# Patient Record
Sex: MALE | Race: WHITE | NOT HISPANIC OR LATINO | URBAN - METROPOLITAN AREA
[De-identification: names, ages, dates, MRNs, and addresses within clinical notes are randomized per-mention and may not be internally consistent; named-entity substitution may affect disease eponyms.]

---

## 2019-04-29 ENCOUNTER — EMERGENCY (EMERGENCY)
Facility: HOSPITAL | Age: 64
LOS: 0 days | Discharge: HOME | End: 2019-04-30
Attending: EMERGENCY MEDICINE | Admitting: EMERGENCY MEDICINE
Payer: COMMERCIAL

## 2019-04-29 VITALS — HEIGHT: 72 IN | WEIGHT: 210.1 LBS
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
## 2019-04-29 DIAGNOSIS — S61.213A LACERATION WITHOUT FOREIGN BODY OF LEFT MIDDLE FINGER WITHOUT DAMAGE TO NAIL, INITIAL ENCOUNTER: ICD-10-CM
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
## 2019-04-29 DIAGNOSIS — Z23 ENCOUNTER FOR IMMUNIZATION: ICD-10-CM

## 2019-04-29 PROCEDURE — 99284 EMERGENCY DEPT VISIT MOD MDM: CPT | Mod: 25

## 2019-04-29 RX ORDER — TETANUS TOXOID, REDUCED DIPHTHERIA TOXOID AND ACELLULAR PERTUSSIS VACCINE, ADSORBED 5; 2.5; 8; 8; 2.5 [IU]/.5ML; [IU]/.5ML; UG/.5ML; UG/.5ML; UG/.5ML
0.5 SUSPENSION INTRAMUSCULAR ONCE
Qty: 0 | Refills: 0 | Status: COMPLETED | OUTPATIENT
Start: 2019-04-29 | End: 2019-04-29

## 2019-04-29 RX ORDER — CEPHALEXIN 500 MG
500 CAPSULE ORAL ONCE
Qty: 0 | Refills: 0 | Status: COMPLETED | OUTPATIENT
Start: 2019-04-29 | End: 2019-04-29

## 2019-04-29 NOTE — ED PROVIDER NOTE - PROGRESS NOTE DETAILS
spoke with orthopedic resident viktor for partial amputation will give pt keflex and tdap prophylaxis. KAYLEEN Nazario DO pt seen and repaired by orthopedic resident ready for d/c to home KAYLEEN Nazario DO

## 2019-04-29 NOTE — ED PROVIDER NOTE - SKIN, MLM
Skin normal color for race, warm, dry and intact. No evidence of rash. left third finger with partial amputation to distal aspect with no blood flow to flap, no cap refill in partial amputation flap, left 4th finger with 1 cm avulsion laceration with missing skin to doral aspect of dip, from, sensation intact, left 5th finger with v shaped laceration between dip and pip no active bleeding.. from and sensory intact

## 2019-04-29 NOTE — ED PROVIDER NOTE - CARE PROVIDER_API CALL
So Amin)  Orthopaedics  [address] [place] [address]  Phone: [phone]  Fax: [phone]  Follow Up Time:

## 2019-04-29 NOTE — ED PROVIDER NOTE - OBJECTIVE STATEMENT
65 yo male pw with laceration to 3 fingers from a glass dish tonight. occurred approx 1 hour ago. pt is rhd. left third finger with incomplate amputation, left 4th finger with avulsion injury to dip and 5th finger with flap laceration no active bleeding.

## 2019-04-29 NOTE — ED PROVIDER NOTE - CLINICAL SUMMARY MEDICAL DECISION MAKING FREE TEXT BOX
pt with finger laceration and finger partial amputation will give keflex and adacel withortho hand consult for partial amputation

## 2019-04-29 NOTE — ED ADULT NURSE NOTE - NSIMPLEMENTINTERV_GEN_ALL_ED
suprapubic region Implemented All Universal Safety Interventions:  Jacob to call system. Call bell, personal items and telephone within reach. Instruct patient to call for assistance. Room bathroom lighting operational. Non-slip footwear when patient is off stretcher. Physically safe environment: no spills, clutter or unnecessary equipment. Stretcher in lowest position, wheels locked, appropriate side rails in place.

## 2019-04-30 VITALS
OXYGEN SATURATION: 100 % | SYSTOLIC BLOOD PRESSURE: 148 MMHG | RESPIRATION RATE: 18 BRPM | DIASTOLIC BLOOD PRESSURE: 97 MMHG | HEART RATE: 72 BPM

## 2019-04-30 PROCEDURE — 73130 X-RAY EXAM OF HAND: CPT | Mod: 26,LT

## 2019-04-30 RX ORDER — CEPHALEXIN 500 MG
1 CAPSULE ORAL
Qty: 28 | Refills: 0 | OUTPATIENT
Start: 2019-04-30 | End: 2019-05-06

## 2019-04-30 RX ADMIN — TETANUS TOXOID, REDUCED DIPHTHERIA TOXOID AND ACELLULAR PERTUSSIS VACCINE, ADSORBED 0.5 MILLILITER(S): 5; 2.5; 8; 8; 2.5 SUSPENSION INTRAMUSCULAR at 00:05

## 2019-04-30 RX ADMIN — Medication 500 MILLIGRAM(S): at 00:05

## 2019-04-30 NOTE — CONSULT NOTE ADULT - SUBJECTIVE AND OBJECTIVE BOX
64y Male community ambulatory RHD presents c/o L 3rd/4th digit lacerations that occurred while dish broke while washing . Denies and other trauma or injury. Denies numbness/tingling. No other pain/injuries. Denies fevers/chills. The patient no significant past ortho Hx. Received IV abx and tdap in ED    PAST MEDICAL & SURGICAL HISTORY:  No pertinent past medical history  No significant past surgical history      MEDICATIONS  (STANDING):  Denies    Allergies  No Known Allergies      Vital Signs Last 24 Hrs  T(C): 36.7 (04-29-19 @ 23:02), Max: 36.7 (04-29-19 @ 23:02)  T(F): 98 (04-29-19 @ 23:02), Max: 98 (04-29-19 @ 23:02)  HR: 72 (04-30-19 @ 02:19) (72 - 92)  BP: 148/97 (04-30-19 @ 02:19) (148/97 - 150/103)  BP(mean): --  RR: 18 (04-30-19 @ 02:19) (18 - 18)  SpO2: 100% (04-30-19 @ 02:19) (98% - 100%)      Imaging:   XR neg Fx L hand/digits    Physical Exam  Gen: NAD  LUE: Warm/well perfused distal aspect digits 1,2,4,5. simicircular lac on dig 3 volar tip w/ minimal bleeding at time of exam, no nail bed involvement, superficial and distal to flexor tendo. sensation intact distally full painless ROM all digits    Procedure: L hand was prepped and draped in sterile fashion, 10cc of 1% lidocaine was used to perform a metacarpal digit block of the L 3rd4th . The laceration was cleaned and approximated using 5-0 gut sutures. The tourniquet was let down with subsequent brisk capillary refill at the tip of the digit. The laceration was dressed with sterile 4x4 and tyshawn The patient tolerated the procedure well. NVI post-procedure.

## 2019-04-30 NOTE — CONSULT NOTE ADULT - ASSESSMENT
A/P: 64y Male with L distal finger tip laceration 3rd digit  Pain control  Ice/elevate as needed  Keep dressing clean and dry  Antibiotics per ER physician   All question answered  Follow up with Dr. maldonado as outpatient within 1 week, call office for appointment   Ortho stable for discharge

## 2020-01-01 NOTE — ED ADULT NURSE NOTE - NSFALLRSKASSESSTYPE_ED_ALL_ED
Mucous membranes moist and pink without lesions; alveolar ridge smooth and edentulous; lip, palate and uvula with acceptable anatomic shape; normal tongue, frenulum and cheek exam; mandible size acceptable.
Initial (On Arrival)

## 2020-06-26 ENCOUNTER — TRANSCRIPTION ENCOUNTER (OUTPATIENT)
Age: 65
End: 2020-06-26

## 2021-03-27 ENCOUNTER — TRANSCRIPTION ENCOUNTER (OUTPATIENT)
Age: 66
End: 2021-03-27

## 2021-04-23 ENCOUNTER — TRANSCRIPTION ENCOUNTER (OUTPATIENT)
Age: 66
End: 2021-04-23

## 2024-08-12 ENCOUNTER — APPOINTMENT (OUTPATIENT)
Dept: ORTHOPEDIC SURGERY | Facility: CLINIC | Age: 69
End: 2024-08-12
Payer: MEDICARE

## 2024-08-12 VITALS — HEIGHT: 72 IN | WEIGHT: 220 LBS | BODY MASS INDEX: 29.8 KG/M2

## 2024-08-12 DIAGNOSIS — M54.16 RADICULOPATHY, LUMBAR REGION: ICD-10-CM

## 2024-08-12 DIAGNOSIS — Z78.9 OTHER SPECIFIED HEALTH STATUS: ICD-10-CM

## 2024-08-12 DIAGNOSIS — Z98.1 OTHER INTERVERTEBRAL DISC DEGENERATION, LUMBAR REGION: ICD-10-CM

## 2024-08-12 DIAGNOSIS — Z98.1 ARTHRODESIS STATUS: ICD-10-CM

## 2024-08-12 DIAGNOSIS — M51.36 OTHER INTERVERTEBRAL DISC DEGENERATION, LUMBAR REGION: ICD-10-CM

## 2024-08-12 DIAGNOSIS — Z00.00 ENCOUNTER FOR GENERAL ADULT MEDICAL EXAMINATION W/OUT ABNORMAL FINDINGS: ICD-10-CM

## 2024-08-12 PROCEDURE — 99203 OFFICE O/P NEW LOW 30 MIN: CPT

## 2024-08-12 PROCEDURE — 72100 X-RAY EXAM L-S SPINE 2/3 VWS: CPT

## 2024-08-12 RX ORDER — METHYLPREDNISOLONE 4 MG/1
4 TABLET ORAL
Qty: 1 | Refills: 0 | Status: ACTIVE | COMMUNITY
Start: 2024-08-12 | End: 1900-01-01

## 2024-08-12 RX ORDER — CYCLOBENZAPRINE HYDROCHLORIDE 10 MG/1
10 TABLET, FILM COATED ORAL 3 TIMES DAILY
Qty: 60 | Refills: 0 | Status: ACTIVE | COMMUNITY
Start: 2024-08-12 | End: 1900-01-01

## 2024-08-12 NOTE — DISCUSSION/SUMMARY
[de-identified] : 69 Y M pt is s/p lumbar spine surgery, recurrence of back pains and radiculopathy, I am requesting updated MRI images of lumbar spine to eval for adjacent segment degeneration. I am prescribing the pt Cyclobenzaprine to aid in symptom control. MDP RX'd to aid in symptom control.  Patient was educated and informed on their condition along with the expected outcomes.   F/U in 2 weeks.   Prior to appointment and during encounter with patient extensive medical records were reviewed including but not limited to, hospital records, out patient records, imaging results, and lab data. During this appointment the patient was examined, diagnoses were discussed and explained in a face to face manner. In addition extensive time was spent reviewing aforementioned diagnostic studies. Counseling including abnormal image results, differential diagnoses, treatment options, risk and benefits, lifestyle changes, current condition, and current medications was performed. Patient's comments, questions, and concerns were address and patient verbalized understanding. Based on this patient's presentation at our office, which is an orthopedic spine surgeon's office, this patient inherently / intrinsically has a risk, however minute, of developing issues such as Cauda equina syndrome, bowel and bladder changes, or progression of motor or neurological deficits such as paralysis which may be permanent.   I, Florecita Sanchez, attest that this documentation has been prepared under the direction and in the presence of provider Owen Higgins MD.

## 2024-08-12 NOTE — DISCUSSION/SUMMARY
[de-identified] : 69 Y M pt is s/p lumbar spine surgery, recurrence of back pains and radiculopathy, I am requesting updated MRI images of lumbar spine to eval for adjacent segment degeneration. I am prescribing the pt Cyclobenzaprine to aid in symptom control. MDP RX'd to aid in symptom control.  Patient was educated and informed on their condition along with the expected outcomes.   F/U in 2 weeks.   Prior to appointment and during encounter with patient extensive medical records were reviewed including but not limited to, hospital records, out patient records, imaging results, and lab data. During this appointment the patient was examined, diagnoses were discussed and explained in a face to face manner. In addition extensive time was spent reviewing aforementioned diagnostic studies. Counseling including abnormal image results, differential diagnoses, treatment options, risk and benefits, lifestyle changes, current condition, and current medications was performed. Patient's comments, questions, and concerns were address and patient verbalized understanding. Based on this patient's presentation at our office, which is an orthopedic spine surgeon's office, this patient inherently / intrinsically has a risk, however minute, of developing issues such as Cauda equina syndrome, bowel and bladder changes, or progression of motor or neurological deficits such as paralysis which may be permanent.   I, Florecita Sanchez, attest that this documentation has been prepared under the direction and in the presence of provider Owen Higgins MD.

## 2024-08-12 NOTE — DATA REVIEWED
[FreeTextEntry1] : On my interpretations of these images from [facility] in [place] on 08/12/2024: I have independently reviewed and interpreted these images. 2 V lumbar x-ray- L4-5 PSF/TLIF w/ small diameter pedicle screws present, appears to be [employer] Solara. adv ddd L3-4 w large osteophytes anteriorly and facet degeneration BL, L4-5 appears to be well healed.

## 2024-08-12 NOTE — PHYSICAL EXAM
[NL (90)] : forward flexion 90 degrees [Flexion] : flexion [3___] : left dorsiflexors 3[unfilled]/5 [0___] : left extensor hallicus longus 0[unfilled]/5 [5___] : right extensor hallicus longus 5[unfilled]/5 [de-identified] : Constitutional: - General Appearance: Unremarkable Body Habitus Well Developed Well Nourished Body Habitus No Deformities Well Groomed Ability To communicate: Normal Neurologic: Global sensation is intact to upper and lower extremities. See examination of Neck and/or Spine for exceptions. Orientation to Time, Place and Person is: Normal Mood And Affect is Normal Skin: - Head/Face, Right Upper/Lower Extremity, Left Upper/Lower Extremity: Normal See Examination of Neck and/or Spine for exceptions Cardiovascular: Peripheral Cardiovascular System is Normal Palpation of Lymph Nodes: Normal Palpation of lymph nodes in: Axilla, Cervical, Inguinal Abnormal Palpation of lymph nodes in: None  [] : non-antalgic [FreeTextEntry9] : FF does cause back & LT sciatic pain [de-identified] : L4 and L5 paresthesia's LT.  [de-identified] : extension 5 degrees

## 2024-08-12 NOTE — DATA REVIEWED
[FreeTextEntry1] : On my interpretations of these images from [facility] in [place] on 08/12/2024: I have independently reviewed and interpreted these images. 2 V lumbar x-ray- L4-5 PSF/TLIF w/ small diameter pedicle screws present, appears to be [employer] Solara. adv ddd L3-4 w large osteophytes anteriorly and facet degeneration BL, L4-5 appears to be well healed.

## 2024-08-12 NOTE — PHYSICAL EXAM
[NL (90)] : forward flexion 90 degrees [Flexion] : flexion [3___] : left dorsiflexors 3[unfilled]/5 [0___] : left extensor hallicus longus 0[unfilled]/5 [5___] : right extensor hallicus longus 5[unfilled]/5 [de-identified] : Constitutional: - General Appearance: Unremarkable Body Habitus Well Developed Well Nourished Body Habitus No Deformities Well Groomed Ability To communicate: Normal Neurologic: Global sensation is intact to upper and lower extremities. See examination of Neck and/or Spine for exceptions. Orientation to Time, Place and Person is: Normal Mood And Affect is Normal Skin: - Head/Face, Right Upper/Lower Extremity, Left Upper/Lower Extremity: Normal See Examination of Neck and/or Spine for exceptions Cardiovascular: Peripheral Cardiovascular System is Normal Palpation of Lymph Nodes: Normal Palpation of lymph nodes in: Axilla, Cervical, Inguinal Abnormal Palpation of lymph nodes in: None  [] : non-antalgic [FreeTextEntry9] : FF does cause back & LT sciatic pain [de-identified] : L4 and L5 paresthesia's LT.  [de-identified] : extension 5 degrees

## 2024-08-12 NOTE — HISTORY OF PRESENT ILLNESS
[de-identified] : 08/12/2024:  69 Y M presenting today for a re-evaluation. He did well post-operatively and back pains were controlled. Pt was traveling abroad in the beginning of July 2024 and states after prolonged traveling of near 20 hours and walking on uneven surfaces has re-exacerbation of back pains. C/o LT sided back pain w/ radicular symptoms to posterior & anterior LLE. 2 weeks prior LLE paresthesia's began.  Pt's LT drop foot remains present; it is improved but never resolved post-op. Kidneys & heart controlled. Low grade cancer, resolved, will follow up with repeat colonoscopy in 2 years. Pt is retired.   PSHx: discectomy (2011) & L4-5 PSF/TLIF (2012)   The patient is a 69 year old male who presents today complaining of low back/left sided sciatic pain Date of Injury/Onset:  3 weeks Pain:    At Rest: _/10 With Activity:  _/10 Mechanism of injury:  possibly from a 20hr flight Quality of symptoms:  sharp shooting pain from low back into left glute and down left leg Improves with:  meds, PT Worse with: at night Prior treatment:   over 1 yr ago Prior Imaging:  no Additional Information: None

## 2024-08-15 ENCOUNTER — RESULT REVIEW (OUTPATIENT)
Age: 69
End: 2024-08-15

## 2024-09-09 ENCOUNTER — APPOINTMENT (OUTPATIENT)
Dept: ORTHOPEDIC SURGERY | Facility: CLINIC | Age: 69
End: 2024-09-09
Payer: MEDICARE

## 2024-09-09 VITALS — BODY MASS INDEX: 29.8 KG/M2 | HEIGHT: 72 IN | WEIGHT: 220 LBS

## 2024-09-09 DIAGNOSIS — M51.26 OTHER INTERVERTEBRAL DISC DISPLACEMENT, LUMBAR REGION: ICD-10-CM

## 2024-09-09 DIAGNOSIS — Z98.1 ARTHRODESIS STATUS: ICD-10-CM

## 2024-09-09 DIAGNOSIS — M54.16 RADICULOPATHY, LUMBAR REGION: ICD-10-CM

## 2024-09-09 DIAGNOSIS — M51.36 OTHER INTERVERTEBRAL DISC DEGENERATION, LUMBAR REGION: ICD-10-CM

## 2024-09-09 DIAGNOSIS — Z98.1 OTHER INTERVERTEBRAL DISC DEGENERATION, LUMBAR REGION: ICD-10-CM

## 2024-09-09 PROCEDURE — 99214 OFFICE O/P EST MOD 30 MIN: CPT

## 2024-09-10 NOTE — DISCUSSION/SUMMARY
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 MRI reviewed & discussed with patient today.  Patient was educated and informed on their condition along with the expected outcomes. Patient will continue with current physical therapy routine and incorporate activities taught into their daily life.  Referring the pt to Dr. Marie to discuss injection therapies for therapeutic & diagnostic purposes, LT L3-4 TFESI   F/U in 2-3 months.   Prior to appointment and during encounter with patient extensive medical records were reviewed including but not limited to, hospital records, out patient records, imaging results, and lab data. During this appointment the patient was examined, diagnoses were discussed and explained in a face to face manner. In addition extensive time was spent reviewing aforementioned diagnostic studies. Counseling including abnormal image results, differential diagnoses, treatment options, risk and benefits, lifestyle changes, current condition, and current medications was performed. Patient's comments, questions, and concerns were address and patient verbalized understanding. Based on this patient's presentation at our office, which is an orthopedic spine surgeon's office, this patient inherently / intrinsically has a risk, however minute, of developing issues such as Cauda equina syndrome, bowel and bladder changes, or progression of motor or neurological deficits such as paralysis which may be permanent.   I, Florecita Sanchez, attest that this documentation has been prepared under the direction and in the presence of provider Owen Higgins MD.

## 2024-09-10 NOTE — HISTORY OF PRESENT ILLNESS
[de-identified] : 09/09/2024: Patient presenting today for an MRI results review.  No back pains. C/o LT sided groin pain and L buttock pain, symptomatic when pt lifts LT leg up. He is treating with PT.    08/12/2024:  69 Y M presenting today for a re-evaluation. He did well post-operatively and back pains were controlled. Pt was traveling abroad in the beginning of July 2024 and states after prolonged traveling of near 20 hours and walking on uneven surfaces has re-exacerbation of back pains. C/o LT sided back pain w/ radicular symptoms to posterior & anterior LLE. 2 weeks prior LLE paresthesia's began.  Pt's LT drop foot remains present; it is improved but never resolved post-op. Kidneys & heart controlled. Low grade cancer, resolved, will follow up with repeat colonoscopy in 2 years. Pt is retired.   PSHx: discectomy (2011) & L4-5 PSF/TLIF (2012)   The patient is a 69 year old male who presents today complaining of low back/left sided sciatic pain Date of Injury/Onset:  3 weeks Pain:    At Rest: _/10 With Activity:  _/10 Mechanism of injury:  possibly from a 20hr flight Quality of symptoms:  sharp shooting pain from low back into left glute and down left leg Improves with:  meds, PT Worse with: at night Prior treatment:   over 1 yr ago Prior Imaging:  no Additional Information: None

## 2024-09-10 NOTE — HISTORY OF PRESENT ILLNESS
[de-identified] : 09/09/2024: Patient presenting today for an MRI results review.  No back pains. C/o LT sided groin pain and L buttock pain, symptomatic when pt lifts LT leg up. He is treating with PT.    08/12/2024:  69 Y M presenting today for a re-evaluation. He did well post-operatively and back pains were controlled. Pt was traveling abroad in the beginning of July 2024 and states after prolonged traveling of near 20 hours and walking on uneven surfaces has re-exacerbation of back pains. C/o LT sided back pain w/ radicular symptoms to posterior & anterior LLE. 2 weeks prior LLE paresthesia's began.  Pt's LT drop foot remains present; it is improved but never resolved post-op. Kidneys & heart controlled. Low grade cancer, resolved, will follow up with repeat colonoscopy in 2 years. Pt is retired.   PSHx: discectomy (2011) & L4-5 PSF/TLIF (2012)   The patient is a 69 year old male who presents today complaining of low back/left sided sciatic pain Date of Injury/Onset:  3 weeks Pain:    At Rest: _/10 With Activity:  _/10 Mechanism of injury:  possibly from a 20hr flight Quality of symptoms:  sharp shooting pain from low back into left glute and down left leg Improves with:  meds, PT Worse with: at night Prior treatment:   over 1 yr ago Prior Imaging:  no Additional Information: None

## 2024-09-10 NOTE — PHYSICAL EXAM
[NL (90)] : forward flexion 90 degrees [Flexion] : flexion [3___] : left dorsiflexors 3[unfilled]/5 [0___] : left extensor hallicus longus 0[unfilled]/5 [5___] : right extensor hallicus longus 5[unfilled]/5 [de-identified] : Constitutional: - General Appearance: Unremarkable Body Habitus Well Developed Well Nourished Body Habitus No Deformities Well Groomed Ability To communicate: Normal Neurologic: Global sensation is intact to upper and lower extremities. See examination of Neck and/or Spine for exceptions. Orientation to Time, Place and Person is: Normal Mood And Affect is Normal Skin: - Head/Face, Right Upper/Lower Extremity, Left Upper/Lower Extremity: Normal See Examination of Neck and/or Spine for exceptions Cardiovascular: Peripheral Cardiovascular System is Normal Palpation of Lymph Nodes: Normal Palpation of lymph nodes in: Axilla, Cervical, Inguinal Abnormal Palpation of lymph nodes in: None  [] : non-antalgic [FreeTextEntry9] : FF does cause back & LT sciatic pain [de-identified] : L4 and L5 paresthesia's LT.  [de-identified] : extension 5 degrees

## 2024-09-10 NOTE — PHYSICAL EXAM
[NL (90)] : forward flexion 90 degrees [Flexion] : flexion [3___] : left dorsiflexors 3[unfilled]/5 [0___] : left extensor hallicus longus 0[unfilled]/5 [5___] : right extensor hallicus longus 5[unfilled]/5 [de-identified] : Constitutional: - General Appearance: Unremarkable Body Habitus Well Developed Well Nourished Body Habitus No Deformities Well Groomed Ability To communicate: Normal Neurologic: Global sensation is intact to upper and lower extremities. See examination of Neck and/or Spine for exceptions. Orientation to Time, Place and Person is: Normal Mood And Affect is Normal Skin: - Head/Face, Right Upper/Lower Extremity, Left Upper/Lower Extremity: Normal See Examination of Neck and/or Spine for exceptions Cardiovascular: Peripheral Cardiovascular System is Normal Palpation of Lymph Nodes: Normal Palpation of lymph nodes in: Axilla, Cervical, Inguinal Abnormal Palpation of lymph nodes in: None  [] : non-antalgic [FreeTextEntry9] : FF does cause back & LT sciatic pain [de-identified] : L4 and L5 paresthesia's LT.  [de-identified] : extension 5 degrees

## 2024-09-10 NOTE — DATA REVIEWED
[FreeTextEntry1] : On my interpretation of these images from [facility] on 08/15/24.  I have additionally reviewed the radiologist report. MRI- s/p L4/5 PSF w/ instrumentation, TLIF L3-4: adv DDD, facet arthropathy, mod BL lateral recess and FS.  L2-3: central to L sided disc extrusion with compressive of L3 nerve root and lateral recess.  L1-2: mild DDD.  T12-L1: mild DDD.    On my interpretations of these images from [facility] in [place] on 08/12/2024: I have independently reviewed and interpreted these images. 2 V lumbar x-ray- L4-5 PSF/TLIF w/ small diameter pedicle screws present, appears to be [employer] Solara. adv ddd L3-4 w large osteophytes anteriorly and facet degeneration BL, L4-5 appears to be well healed.

## 2024-09-10 NOTE — DATA REVIEWED
[FreeTextEntry1] : On my interpretation of these images from [facility] on 08/15/24.  I have additionally reviewed the radiologist report. MRI- s/p L4/5 PSF w/ instrumentation, TLIF L3-4: adv DDD, facet arthropathy, mod BL lateral recess and FS.  L2-3: central to L sided disc extrusion with compressive of L3 nerve root and lateral recess.  L1-2: mild DDD.  T12-L1: mild DDD.    On my interpretations of these images from [facility] in [place] on 08/12/2024: I have independently reviewed and interpreted these images. 2 V lumbar x-ray- L4-5 PSF/TLIF w/ small diameter pedicle screws present, appears to be [employer] Solara. adv ddd L3-4 w large osteophytes anteriorly and facet degeneration BL, L4-5 appears to be well healed.

## 2024-09-10 NOTE — DISCUSSION/SUMMARY
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 MRI reviewed & discussed with patient today.  Patient was educated and informed on their condition along with the expected outcomes. Patient will continue with current physical therapy routine and incorporate activities taught into their daily life.  Referring the pt to Dr. Marie to discuss injection therapies for therapeutic & diagnostic purposes, LT L3-4 TFESI   F/U in 2-3 months.   Prior to appointment and during encounter with patient extensive medical records were reviewed including but not limited to, hospital records, out patient records, imaging results, and lab data. During this appointment the patient was examined, diagnoses were discussed and explained in a face to face manner. In addition extensive time was spent reviewing aforementioned diagnostic studies. Counseling including abnormal image results, differential diagnoses, treatment options, risk and benefits, lifestyle changes, current condition, and current medications was performed. Patient's comments, questions, and concerns were address and patient verbalized understanding. Based on this patient's presentation at our office, which is an orthopedic spine surgeon's office, this patient inherently / intrinsically has a risk, however minute, of developing issues such as Cauda equina syndrome, bowel and bladder changes, or progression of motor or neurological deficits such as paralysis which may be permanent.   I, Florecita Sanchez, attest that this documentation has been prepared under the direction and in the presence of provider Owen Higgins MD.

## 2024-10-07 ENCOUNTER — APPOINTMENT (OUTPATIENT)
Dept: PAIN MANAGEMENT | Facility: CLINIC | Age: 69
End: 2024-10-07
Payer: MEDICARE

## 2024-10-07 VITALS — HEIGHT: 72 IN | BODY MASS INDEX: 31.42 KG/M2 | WEIGHT: 232 LBS

## 2024-10-07 DIAGNOSIS — Z98.1 ARTHRODESIS STATUS: ICD-10-CM

## 2024-10-07 DIAGNOSIS — M51.369: ICD-10-CM

## 2024-10-07 DIAGNOSIS — M54.16 RADICULOPATHY, LUMBAR REGION: ICD-10-CM

## 2024-10-07 DIAGNOSIS — Z98.1: ICD-10-CM

## 2024-10-07 DIAGNOSIS — M51.26 OTHER INTERVERTEBRAL DISC DISPLACEMENT, LUMBAR REGION: ICD-10-CM

## 2024-10-07 PROCEDURE — 99204 OFFICE O/P NEW MOD 45 MIN: CPT

## 2024-11-08 ENCOUNTER — APPOINTMENT (OUTPATIENT)
Dept: ORTHOPEDIC SURGERY | Facility: CLINIC | Age: 69
End: 2024-11-08
Payer: MEDICARE

## 2024-11-08 VITALS — BODY MASS INDEX: 31.42 KG/M2 | WEIGHT: 232 LBS | HEIGHT: 72 IN

## 2024-11-08 DIAGNOSIS — Z98.1: ICD-10-CM

## 2024-11-08 DIAGNOSIS — M51.369: ICD-10-CM

## 2024-11-08 DIAGNOSIS — Z98.1 ARTHRODESIS STATUS: ICD-10-CM

## 2024-11-08 PROCEDURE — 99213 OFFICE O/P EST LOW 20 MIN: CPT
